# Patient Record
Sex: FEMALE | Race: WHITE | NOT HISPANIC OR LATINO | Employment: OTHER | URBAN - METROPOLITAN AREA
[De-identification: names, ages, dates, MRNs, and addresses within clinical notes are randomized per-mention and may not be internally consistent; named-entity substitution may affect disease eponyms.]

---

## 2022-01-31 ENCOUNTER — CONSULTATION/EVALUATION (OUTPATIENT)
Dept: URBAN - METROPOLITAN AREA CLINIC 35 | Facility: CLINIC | Age: 87
End: 2022-01-31

## 2022-01-31 DIAGNOSIS — H25.812: ICD-10-CM

## 2022-01-31 DIAGNOSIS — H25.811: ICD-10-CM

## 2022-01-31 PROCEDURE — 92015 DETERMINE REFRACTIVE STATE: CPT

## 2022-01-31 PROCEDURE — 92004 COMPRE OPH EXAM NEW PT 1/>: CPT

## 2022-01-31 PROCEDURE — 92136TC INTERFEROMETRY - TECHNICAL COMPONENT

## 2022-01-31 RX ORDER — NEPAFENAC 3 MG/ML: 1 SUSPENSION/ DROPS OPHTHALMIC EVERY EVENING

## 2022-01-31 RX ORDER — MOXIFLOXACIN HYDROCHLORIDE 5 MG/ML: 1 SOLUTION/ DROPS OPHTHALMIC

## 2022-01-31 RX ORDER — DUREZOL 0.5 MG/ML: 1 EMULSION OPHTHALMIC TWICE A DAY

## 2022-01-31 ASSESSMENT — VISUAL ACUITY
OS_AM: 20/25
OS_SC: J8
OD_CC: 20/40
OD_BAT: 20/80 W/ MR
OS_CC: 20/50-2
OD_CC: J4-
OD_SC: 20/40-2
OS_SC: 20/60-1
OS_PH: 20/40
OS_CC: J6+
OD_SC: J12

## 2022-01-31 ASSESSMENT — TONOMETRY
OD_IOP_MMHG: 13
OS_IOP_MMHG: 13

## 2022-04-21 ENCOUNTER — PRE-OP/H&P (OUTPATIENT)
Dept: URBAN - METROPOLITAN AREA SURGERY 14 | Facility: SURGERY | Age: 87
End: 2022-04-21

## 2022-04-21 ENCOUNTER — SURGERY/PROCEDURE (OUTPATIENT)
Dept: URBAN - METROPOLITAN AREA CLINIC 35 | Facility: CLINIC | Age: 87
End: 2022-04-21

## 2022-04-21 DIAGNOSIS — H25.813: ICD-10-CM

## 2022-04-21 PROCEDURE — 6698454 REMOVE CATARACT;INSERT LENS (SX ONLY)

## 2022-04-21 PROCEDURE — 99211T TECH SERVICE

## 2022-04-22 ENCOUNTER — POST-OP (OUTPATIENT)
Dept: URBAN - METROPOLITAN AREA CLINIC 35 | Facility: CLINIC | Age: 87
End: 2022-04-22

## 2022-04-22 DIAGNOSIS — H25.812: ICD-10-CM

## 2022-04-22 DIAGNOSIS — Z96.1: ICD-10-CM

## 2022-04-22 PROCEDURE — 99024 POSTOP FOLLOW-UP VISIT: CPT

## 2022-04-22 ASSESSMENT — TONOMETRY: OS_IOP_MMHG: 11

## 2022-04-22 ASSESSMENT — VISUAL ACUITY: OS_SC: 20/25-2

## 2022-04-28 ENCOUNTER — POST OP/EVAL OF SECOND EYE (OUTPATIENT)
Dept: URBAN - METROPOLITAN AREA CLINIC 39 | Facility: CLINIC | Age: 87
End: 2022-04-28

## 2022-04-28 ENCOUNTER — SURGERY/PROCEDURE (OUTPATIENT)
Dept: URBAN - METROPOLITAN AREA CLINIC 35 | Facility: CLINIC | Age: 87
End: 2022-04-28

## 2022-04-28 DIAGNOSIS — H25.811: ICD-10-CM

## 2022-04-28 DIAGNOSIS — Z96.1: ICD-10-CM

## 2022-04-28 DIAGNOSIS — H25.812: ICD-10-CM

## 2022-04-28 PROCEDURE — 6698454 REMOVE CATARACT;INSERT LENS (SX ONLY)

## 2022-04-28 PROCEDURE — 92012 INTRM OPH EXAM EST PATIENT: CPT

## 2022-04-28 ASSESSMENT — VISUAL ACUITY
OD_BAT: 20/80
OS_SC: J10
OD_SC: 20/40
OS_CC: 20/25+2
OS_SC: 20/30+1

## 2022-04-28 ASSESSMENT — TONOMETRY
OS_IOP_MMHG: 12
OD_IOP_MMHG: 12

## 2022-04-29 ENCOUNTER — POST-OP (OUTPATIENT)
Dept: URBAN - METROPOLITAN AREA CLINIC 35 | Facility: CLINIC | Age: 87
End: 2022-04-29

## 2022-04-29 DIAGNOSIS — Z96.1: ICD-10-CM

## 2022-04-29 DIAGNOSIS — H25.812: ICD-10-CM

## 2022-04-29 PROCEDURE — 99024 POSTOP FOLLOW-UP VISIT: CPT

## 2022-04-29 ASSESSMENT — VISUAL ACUITY: OD_SC: 20/20-2

## 2022-04-29 ASSESSMENT — TONOMETRY
OD_IOP_MMHG: 20
OS_IOP_MMHG: 15

## 2022-10-10 NOTE — PATIENT DISCUSSION
I have followed patient for 20 years. May be some questionable early change OD. Could be secondary to testing. Recommend follow up with Dr. Mane Part before the end of the year.

## 2023-07-12 ENCOUNTER — SURGERY/PROCEDURE (OUTPATIENT)
Dept: URBAN - METROPOLITAN AREA SURGERY 14 | Facility: SURGERY | Age: 88
End: 2023-07-12

## 2023-07-12 ENCOUNTER — CONSULTATION/EVALUATION (OUTPATIENT)
Dept: URBAN - METROPOLITAN AREA CLINIC 39 | Facility: CLINIC | Age: 88
End: 2023-07-12

## 2023-07-12 DIAGNOSIS — Z96.1: ICD-10-CM

## 2023-07-12 DIAGNOSIS — H26.493: ICD-10-CM

## 2023-07-12 PROCEDURE — 92014 COMPRE OPH EXAM EST PT 1/>: CPT

## 2023-07-12 ASSESSMENT — TONOMETRY
OS_IOP_MMHG: 14
OD_IOP_MMHG: 14

## 2023-07-12 ASSESSMENT — VISUAL ACUITY
OD_SC: 20/30
OS_SC: 20/30